# Patient Record
Sex: FEMALE | Race: WHITE | NOT HISPANIC OR LATINO | Employment: STUDENT | URBAN - METROPOLITAN AREA
[De-identification: names, ages, dates, MRNs, and addresses within clinical notes are randomized per-mention and may not be internally consistent; named-entity substitution may affect disease eponyms.]

---

## 2024-03-14 ENCOUNTER — OFFICE VISIT (OUTPATIENT)
Dept: OBSTETRICS AND GYNECOLOGY | Facility: CLINIC | Age: 54
End: 2024-03-14
Payer: COMMERCIAL

## 2024-03-14 VITALS
SYSTOLIC BLOOD PRESSURE: 142 MMHG | DIASTOLIC BLOOD PRESSURE: 94 MMHG | WEIGHT: 158.6 LBS | HEIGHT: 63 IN | BODY MASS INDEX: 28.1 KG/M2

## 2024-03-14 DIAGNOSIS — Z01.419 CERVICAL SMEAR, AS PART OF ROUTINE GYNECOLOGICAL EXAMINATION: ICD-10-CM

## 2024-03-14 DIAGNOSIS — Z01.419 ROUTINE GYNECOLOGICAL EXAMINATION: Primary | ICD-10-CM

## 2024-03-14 DIAGNOSIS — N76.0 BV (BACTERIAL VAGINOSIS): ICD-10-CM

## 2024-03-14 DIAGNOSIS — B96.89 BV (BACTERIAL VAGINOSIS): ICD-10-CM

## 2024-03-14 DIAGNOSIS — Z11.51 SCREENING FOR HUMAN PAPILLOMAVIRUS: ICD-10-CM

## 2024-03-14 LAB
BILIRUB BLD-MCNC: NEGATIVE MG/DL
CLARITY, POC: CLEAR
COLOR UR: YELLOW
GLUCOSE UR STRIP-MCNC: NEGATIVE MG/DL
KETONES UR QL: NEGATIVE
LEUKOCYTE EST, POC: NEGATIVE
NITRITE UR-MCNC: NEGATIVE MG/ML
PH UR: 5 [PH] (ref 5–8)
PROT UR STRIP-MCNC: NEGATIVE MG/DL
RBC # UR STRIP: NEGATIVE /UL
SP GR UR: 1 (ref 1–1.03)
UROBILINOGEN UR QL: NORMAL

## 2024-03-14 RX ORDER — METRONIDAZOLE 500 MG/1
500 TABLET ORAL 2 TIMES DAILY
Qty: 14 TABLET | Refills: 2 | Status: SHIPPED | OUTPATIENT
Start: 2024-03-14 | End: 2024-03-21

## 2024-03-14 NOTE — PROGRESS NOTES
GYN Annual Exam     CC- Here for annual exam.     Nicole FREEMAN is a 53 y.o. female who presents for annual well woman exam. Periods are absent. For last 5 months.  Concerned about BV.  Request Flagyl.    OB History    No obstetric history on file.         Current contraception: post menopausal status  History of abnormal Pap smear: yes - HPV +  Family history of uterine, colon or ovarian cancer: no  History of abnormal mammogram: yes - Bx was benign this year  Family history of breast cancer: no  Last Pap : 2021 ASCUS + HPV    Past Medical History:   Diagnosis Date    Anxiety     Disease of thyroid gland     Hypertension     Irregular heartbeat     Psoriasis        Past Surgical History:   Procedure Laterality Date    WISDOM TOOTH EXTRACTION           Current Outpatient Medications:     metoprolol succinate XL (TOPROL-XL) 50 MG 24 hr tablet, Take 50 mg by mouth Daily., Disp: , Rfl:     sertraline (ZOLOFT) 100 MG tablet, Take 150 mg by mouth., Disp: , Rfl:     SYNTHROID 50 MCG tablet, , Disp: , Rfl:     No Known Allergies    Social History     Tobacco Use    Smoking status: Never    Smokeless tobacco: Never   Substance Use Topics    Alcohol use: Yes     Comment: occ    Drug use: No         Family History   Problem Relation Age of Onset    Prostate cancer Father     Depression Mother     No Known Problems Brother     Pancreatic cancer Paternal Grandmother     Throat cancer Maternal Grandfather     Breast cancer Neg Hx     Colon cancer Neg Hx     Uterine cancer Neg Hx     Ovarian cancer Neg Hx        Review of Systems   Constitutional:  Negative for appetite change, fever and unexpected weight change.   HENT:  Negative for congestion and sore throat.    Respiratory:  Negative for cough and shortness of breath.    Cardiovascular:  Negative for chest pain and palpitations.   Gastrointestinal:  Negative for abdominal distention, abdominal pain, constipation, diarrhea, nausea and vomiting.   Endocrine: Negative.   "  Genitourinary:  Positive for vaginal discharge. Negative for dyspareunia, menstrual problem and pelvic pain.   Skin: Negative.    Neurological:  Negative for dizziness and syncope.   Hematological: Negative.    Psychiatric/Behavioral:  Negative for dysphoric mood and sleep disturbance. The patient is not nervous/anxious.      /94   Ht 160 cm (63\")   Wt 71.9 kg (158 lb 9.6 oz)   BMI 28.09 kg/m²     Physical Exam  Vitals and nursing note reviewed. Exam conducted with a chaperone present.   Constitutional:       Appearance: She is well-developed.   HENT:      Head: Normocephalic and atraumatic.   Neck:      Thyroid: No thyromegaly.   Cardiovascular:      Rate and Rhythm: Normal rate and regular rhythm.   Pulmonary:      Effort: Pulmonary effort is normal.      Breath sounds: Normal breath sounds.   Chest:   Breasts:     Breasts are symmetrical.      Right: No mass or nipple discharge.      Left: No mass or nipple discharge.   Abdominal:      General: Bowel sounds are normal. There is no distension.      Palpations: Abdomen is soft. There is no mass.      Tenderness: There is no abdominal tenderness. There is no guarding or rebound.   Genitourinary:     General: Normal vulva.      Exam position: Lithotomy position.      Labia:         Right: No lesion.         Left: No lesion.       Vagina: Normal.      Cervix: No cervical motion tenderness or discharge.      Uterus: Normal.       Adnexa:         Right: No mass.          Left: No mass.     Musculoskeletal:         General: Normal range of motion.      Cervical back: Normal range of motion and neck supple.   Skin:     General: Skin is warm and dry.   Neurological:      Mental Status: She is alert and oriented to person, place, and time.   Psychiatric:         Behavior: Behavior normal.         Thought Content: Thought content normal.         Judgment: Judgment normal.     Diagnoses and all orders for this visit:    Routine gynecological examination  -     POC " Urinalysis Dipstick  -     IGP, Apt HPV,rfx 16 / 18,45    Cervical smear, as part of routine gynecological examination  -     IGP, Apt HPV,rfx 16 / 18,45    Screening for human papillomavirus  -     IGP, Apt HPV,rfx 16 / 18,45        Assessment     1) GYN annual well woman exam.   2) vaginal discharge-Flagyl prescription sent in.     Plan     1) Breast Health - Clinical breast exam & mammogram yearly, Self breast awareness monthly  2) Pap - done today  3) Smoking status - Nicole FREEMAN  reports that she has never smoked. She has never used smokeless tobacco. I have educated her on the risk of diseases from using tobacco products such as cancer, COPD, and heart disease.   4) Colon health - screening colonoscopy recommended if not up to date  5) Bone health - Weight bearing exercise, dietary calcium recommendations and vitamin D reviewed.   6) Seat belts recommended  7) Follow up prn and one year    Encounter Diagnoses   Name Primary?    Routine gynecological examination Yes    Cervical smear, as part of routine gynecological examination     Screening for human papillomavirus          Jason Ricks MD  3/14/2024  15:48 EDT

## 2024-03-18 LAB
CYTOLOGIST CVX/VAG CYTO: NORMAL
CYTOLOGY CVX/VAG DOC CYTO: NORMAL
CYTOLOGY CVX/VAG DOC THIN PREP: NORMAL
DX ICD CODE: NORMAL
HPV I/H RISK 4 DNA CVX QL PROBE+SIG AMP: NEGATIVE
Lab: NORMAL
OTHER STN SPEC: NORMAL
STAT OF ADQ CVX/VAG CYTO-IMP: NORMAL